# Patient Record
Sex: FEMALE | Race: WHITE | NOT HISPANIC OR LATINO | Employment: PART TIME | URBAN - METROPOLITAN AREA
[De-identification: names, ages, dates, MRNs, and addresses within clinical notes are randomized per-mention and may not be internally consistent; named-entity substitution may affect disease eponyms.]

---

## 2019-03-02 ENCOUNTER — TELEPHONE (OUTPATIENT)
Dept: URGENT CARE | Facility: MEDICAL CENTER | Age: 24
End: 2019-03-02

## 2019-03-02 NOTE — TELEPHONE ENCOUNTER
Called and left message with pt about urine culture results which came back positive for E.coli.   I told her she could continue the abx therapy with a positive urine culture which was sensitive to the abx she was placed on.  Encouraged Pt to call back with questions.  Domingo Washington PA-C